# Patient Record
Sex: FEMALE | Race: WHITE
[De-identification: names, ages, dates, MRNs, and addresses within clinical notes are randomized per-mention and may not be internally consistent; named-entity substitution may affect disease eponyms.]

---

## 2020-08-08 ENCOUNTER — HOSPITAL ENCOUNTER (EMERGENCY)
Dept: HOSPITAL 43 - DL.ED | Age: 40
Discharge: NURSING, REHAB OR LONG-TERM CARE | End: 2020-08-08
Payer: MEDICAID

## 2020-08-08 DIAGNOSIS — T18.128A: Primary | ICD-10-CM

## 2020-08-08 DIAGNOSIS — Z79.899: ICD-10-CM

## 2020-08-08 DIAGNOSIS — Z91.048: ICD-10-CM

## 2020-08-08 DIAGNOSIS — Z88.8: ICD-10-CM

## 2020-08-08 DIAGNOSIS — Z88.2: ICD-10-CM

## 2020-08-08 DIAGNOSIS — F17.210: ICD-10-CM

## 2020-08-08 PROCEDURE — C9113 INJ PANTOPRAZOLE SODIUM, VIA: HCPCS

## 2020-08-08 PROCEDURE — 96374 THER/PROPH/DIAG INJ IV PUSH: CPT

## 2020-08-08 PROCEDURE — 99285 EMERGENCY DEPT VISIT HI MDM: CPT

## 2020-08-08 PROCEDURE — 96375 TX/PRO/DX INJ NEW DRUG ADDON: CPT

## 2020-08-08 NOTE — EDM.PDOC
Scribed by Leighann Bean 08/08/20 1403 for Duncan Arzola MD





ED HPI GENERAL MEDICAL PROBLEM





- General


Chief Complaint: Gastrointestinal Problem


Stated Complaint: [id number] THROWING UP SINCE LAST NIGHT


Time Seen by Provider: 08/08/20 14:01


Source of Information: Reports: Patient, RN, RN Notes Reviewed


History Limitations: Reports: No Limitations





- History of Present Illness


INITIAL COMMENTS - FREE TEXT/NARRATIVE: 


Patient presents to ER by POV. Patient states had pork roast last evening. 

Patient states that sometimes meat will get stuck in her esophagus. Patient 

states that she started throwing up last evening. Patient states that she has 

been drinking milk today but it will not go down or stay down. She tries to 

swallow her saliva, but it builds up until she spits it up.  Patient took a 

Dramamine within last couple hours. Patient states she has acid reflux. She has 

had food get stuck several times in the past, and has cancelled EGD at least 

twice because she is afraid of having a procedure.


Onset Date: 08/07/20


Duration: Getting Worse


Location: Reports: Abdomen


Quality: Reports: Ache


Severity: Moderate


Improves with: Reports: None


Worsens with: Reports: None


Associated Symptoms: Reports: No Other Symptoms


  ** Epigastric


Pain Score (Numeric/FACES): 8





- Related Data


                                    Allergies











Allergy/AdvReac Type Severity Reaction Status Date / Time


 


milnacipran HCl Allergy Unknown UNKNOWN Verified 08/08/20 14:05





[From Savella]     


 


multivitamin with iron,other Allergy  Airway Verified 08/08/20 14:05





minera   Tightness  





[From Multilex]     


 


pregabalin [From Lyrica] Allergy  Airway Verified 08/08/20 14:05





   Tightness  


 


rizatriptan benzoate Allergy  UNKNOWN Verified 08/08/20 14:05





[From Maxalt]     


 


rofecoxib [From Vioxx] Allergy  Hives Verified 08/08/20 14:05


 


Sulfa (Sulfonamide Allergy  Nausea Verified 08/08/20 14:05





Antibiotics)     


 


sumatriptan [From Imitrex] Allergy  Airway Verified 08/08/20 14:05





   Tightness  


 


paper tape Allergy  Hives Uncoded 08/08/20 14:05











Home Meds: 


                                    Home Meds





DULoxetine HCl [Cymbalta] 60 mg PO DAILY 12/22/13 [History]


Levothyroxine 150 mcg PO ACBRK 12/22/13 [History]


Ondansetron [Zofran Odt] 8 mg PO Q6H PRN 12/22/13 [History]


traZODone HCl [Trazodone HCl] 50 mg PO DAILY 08/08/20 [History]











Social & Family History





- Tobacco Use


Smoking Status *Q: Current Every Day Smoker


Years of Tobacco use: 10


Packs/Tins Daily: 0.5





- Caffeine Use


Caffeine Use: Reports: Soda





- Recreational Drug Use


Recreational Drug Use: No





- Living Situation & Occupation


Living situation: Reports: with Family


Occupation: Disabled





ED ROS GENERAL





- Review of Systems


Review Of Systems: Comprehensive ROS is negative, except as noted in HPI.





ED EXAM, GI/ABD





- Physical Exam


Exam: See Below


Exam Limited By: No Limitations


General Appearance: Alert, WD/WN, No Apparent Distress, Anxious


Eyes: Bilateral: Normal Appearance


Nose: Normal Inspection, No Blood


Throat/Mouth: Normal Lips, Normal Oropharynx, Normal Voice, No Airway Compromise


Head: Atraumatic, Normocephalic


Neck: Normal Inspection


Respiratory/Chest: No Respiratory Distress, Lungs Clear, Normal Breath Sounds, 

No Accessory Muscle Use, Chest Non-Tender


Cardiovascular: Regular Rate, Rhythm, Tachycardia


GI/Abdominal Exam: Normal Bowel Sounds, Soft, No Distention, No Abnormal Bruit, 

Tender (Mild epigastric tenderness).  No: Guarding, Rigid, Rebound


Neurological: Alert, Oriented, No Motor/Sensory Deficits


Psychiatric: Anxious


Skin Exam: Warm, Dry, Intact, Normal Color, No Rash





Course





- Vital Signs


Last Recorded V/S: 


                                Last Vital Signs











Temp  97.2 F   08/08/20 13:59


 


Pulse  103 H  08/08/20 13:59


 


Resp  20   08/08/20 13:59


 


BP  138/88   08/08/20 13:59


 


Pulse Ox  97   08/08/20 13:59














- Orders/Labs/Meds


Orders: 


                               Active Orders 24 hr











 Category Date Time Status


 


 Peripheral IV Care [RC] .AS DIRECTED Care  08/08/20 14:12 Active


 


 Sodium Chloride 0.9% [Saline Flush] Med  08/08/20 14:12 Active





 10 ml FLUSH ASDIRECTED PRN   


 


 Peripheral IV Insertion Adult [OM.PC] Stat Oth  08/08/20 14:12 Ordered








                                Medication Orders





Sodium Chloride (Saline Flush)  10 ml FLUSH ASDIRECTED PRN


   PRN Reason: Keep Vein Open


   Last Admin: 08/08/20 14:40  Dose: 10 ml


   Documented by: KRISTIN








Meds: 


Medications











Generic Name Dose Route Start Last Admin





  Trade Name Freq  PRN Reason Stop Dose Admin


 


Sodium Chloride  10 ml  08/08/20 14:12  08/08/20 14:40





  Saline Flush  FLUSH   10 ml





  ASDIRECTED PRN   Administration





  Keep Vein Open  














Discontinued Medications














Generic Name Dose Route Start Last Admin





  Trade Name Freq  PRN Reason Stop Dose Admin


 


Glucagon  1 mg  08/08/20 15:25  08/08/20 15:58





  Glucagen  IVPUSH  08/08/20 15:26  1 mg





  ONETIME ONE   Administration


 


Sodium Chloride  1,000 mls @ 999 mls/hr  08/08/20 14:12  08/08/20 14:41





  Normal Saline  IV  08/08/20 15:12  999 mls/hr





  .BOLUS ONE   Administration


 


Lorazepam  0.5 mg  08/08/20 15:26  08/08/20 16:05





  Ativan  IVPUSH  08/08/20 15:27  0.5 mg





  ONETIME ONE   Administration


 


Metoclopramide HCl  10 mg  08/08/20 15:26  08/08/20 16:07





  Reglan  IVPUSH  08/08/20 15:27  10 mg





  ONETIME ONE   Administration


 


Ondansetron HCl  4 mg  08/08/20 14:12  08/08/20 14:38





  Zofran  IV  08/08/20 14:13  4 mg





  ONETIME ONE   Administration


 


Pantoprazole Sodium  40 mg  08/08/20 14:12  08/08/20 14:39





  Protonix Iv***  IVPUSH  08/08/20 14:13  40 mg





  ONETIME ONE   Administration














- Re-Assessments/Exams


Free Text/Narrative Re-Assessment/Exam: 





08/08/20 16:30


Pt unable to keep down water after glucagon. She attempted to swallow the meat 

last evening and delayed coming to the ER in hopes of getting it to go down. I 

will have to transfer her to [facility] for access to GI, Dr. Lr accepts the pt.





Departure





- Departure


Time of Disposition: 16:31


Disposition: DC/Tfer to Acute Hospital [facility]


Condition: Fair


Clinical Impression: 


 Esophageal obstruction due to food impaction








- Discharge Information


*PRESCRIPTION DRUG MONITORING PROGRAM REVIEWED*: Not Applicable


*COPY OF PRESCRIPTION DRUG MONITORING REPORT IN PATIENT FAWAD: Not Applicable


Forms:  ED Department Discharge, Interfacility Transfer [facility]





Sepsis Event Note (ED)





- Evaluation


Sepsis Screening Result: No Definite Risk





- Focused Exam


Vital Signs: 


                                   Vital Signs











  Temp Pulse Resp BP Pulse Ox


 


 08/08/20 13:59  97.2 F  103 H  20  138/88  97














- My Orders


Last 24 Hours: 


My Active Orders





08/08/20 14:12


Peripheral IV Care [RC] .AS DIRECTED 


Sodium Chloride 0.9% [Saline Flush]   10 ml FLUSH ASDIRECTED PRN 


Peripheral IV Insertion Adult [OM.PC] Stat 














- Assessment/Plan


Last 24 Hours: 


My Active Orders





08/08/20 14:12


Peripheral IV Care [RC] .AS DIRECTED 


Sodium Chloride 0.9% [Saline Flush]   10 ml FLUSH ASDIRECTED PRN 


Peripheral IV Insertion Adult [OM.PC] Stat 














I have read and agree with the documentation that has been completed regarding 

this visit. By signing this record, I attest that the documentation was 

completed in my physical presence and is an accurate record of the encounter.

## 2022-06-25 ENCOUNTER — HOSPITAL ENCOUNTER (EMERGENCY)
Dept: HOSPITAL 43 - DL.ED | Age: 42
Discharge: HOME | End: 2022-06-25
Payer: MEDICAID

## 2022-06-25 DIAGNOSIS — Z79.899: ICD-10-CM

## 2022-06-25 DIAGNOSIS — K21.9: ICD-10-CM

## 2022-06-25 DIAGNOSIS — Z91.048: ICD-10-CM

## 2022-06-25 DIAGNOSIS — Z88.8: ICD-10-CM

## 2022-06-25 DIAGNOSIS — Z88.2: ICD-10-CM

## 2022-06-25 DIAGNOSIS — N39.0: Primary | ICD-10-CM

## 2022-06-25 LAB
AMPHET UR QL SCN: NEGATIVE
AMPHET UR QL SCN: NEGATIVE
AMPHETAMINES UR QL SCN>500 NG/ML: NEGATIVE
BARBITURATES UR QL SCN: NEGATIVE
MDMA UR QL SCN: NEGATIVE
OXYCODONE UR QL SCN: NEGATIVE
PCP UR QL SCN: NEGATIVE
TRICYCLICS UR QL SCN: NEGATIVE